# Patient Record
Sex: MALE | Race: WHITE | URBAN - METROPOLITAN AREA
[De-identification: names, ages, dates, MRNs, and addresses within clinical notes are randomized per-mention and may not be internally consistent; named-entity substitution may affect disease eponyms.]

---

## 2020-07-25 ENCOUNTER — TELEPHONE ENCOUNTER (OUTPATIENT)
Dept: URBAN - METROPOLITAN AREA CLINIC 13 | Facility: CLINIC | Age: 70
End: 2020-07-25

## 2020-07-25 RX ORDER — LOSARTAN POTASSIUM 100 MG/1
TAKE 1 TABLET DAILY TABLET, FILM COATED ORAL
Qty: 30 | Refills: 0 | OUTPATIENT
Start: 2013-06-18 | End: 2014-09-26

## 2020-07-25 RX ORDER — AMOXICILLIN AND CLAVULANATE POTASSIUM 875; 125 MG/1; MG/1
TAKE 1 TABLET TWICE DAILY TABLET, FILM COATED ORAL
Qty: 14 | Refills: 0 | OUTPATIENT
Start: 2014-09-26 | End: 2014-10-15

## 2020-07-25 RX ORDER — CHLORTHALIDONE 25 MG/1
TAKE 1/2 TABLET DAILY TABLET ORAL
Refills: 0 | OUTPATIENT
End: 2018-06-12

## 2020-07-25 RX ORDER — MELOXICAM 15 MG/1
TAKE 1 TABLET DAILY TABLET ORAL
Refills: 0 | OUTPATIENT
End: 2018-06-29

## 2020-07-25 RX ORDER — SITAGLIPTIN PHOSPHATE 100 MG
TAKE 1 TABLET DAILY PATIENT STATES DOSAGE UNKNOWN TABLET ORAL
Refills: 0 | OUTPATIENT
End: 2013-06-06

## 2020-07-25 RX ORDER — INSULIN GLARGINE 100 [IU]/ML
INJECT 30 UNIT TWICE DAILY INJECTION, SOLUTION SUBCUTANEOUS
Refills: 0 | OUTPATIENT
End: 2017-02-23

## 2020-07-25 RX ORDER — POLYETHYLENE GLYCOL 3350, SODIUM SULFATE, SODIUM CHLORIDE, POTASSIUM CHLORIDE, ASCORBIC ACID, SODIUM ASCORBATE 7.5-2.691G
TAKE 32 OZ AS DIRECTED 5:00PM THE EVENING BEFORE AND 6HR PRIOR TO PROCEDURE KIT ORAL
Qty: 1 | Refills: 0 | OUTPATIENT
Start: 2013-08-06 | End: 2013-08-22

## 2020-07-25 RX ORDER — POLYETHYLENE GLYCOL 3350, SODIUM CHLORIDE, SODIUM BICARBONATE AND POTASSIUM CHLORIDE WITH LEMON FLAVOR 420; 11.2; 5.72; 1.48 G/4L; G/4L; G/4L; G/4L
TAKE AS DIRECTED POWDER, FOR SOLUTION ORAL
Qty: 1 | Refills: 0 | OUTPATIENT
Start: 2015-09-03 | End: 2015-09-08

## 2020-07-25 RX ORDER — SITAGLIPTIN PHOSPHATE 100 MG
TAKE 1 TABLET DAILY UNKNOWN DOSAGE TABLET ORAL
Refills: 0 | OUTPATIENT
Start: 2011-09-23 | End: 2011-11-02

## 2020-07-25 RX ORDER — AMOXICILLIN 500 MG/1
TAKE 1 CAPSULE 3 TIMES DAILY UNTIL GONE CAPSULE ORAL
Refills: 0 | OUTPATIENT
End: 2017-02-23

## 2020-07-25 RX ORDER — ALLOPURINOL 300 MG/1
TAKE 1 TABLET DAILY TABLET ORAL
Refills: 0 | OUTPATIENT
End: 2014-09-26

## 2020-07-25 RX ORDER — LOSARTAN POTASSIUM 50 MG/1
TABLET, FILM COATED ORAL
Qty: 30 | Refills: 0 | OUTPATIENT
Start: 2011-12-12 | End: 2013-07-09

## 2020-07-25 RX ORDER — PRASUGREL HYDROCHLORIDE 10 MG/1
TAKE 1 TABLET DAILY TABLET, COATED ORAL
Refills: 0 | OUTPATIENT
End: 2017-02-23

## 2020-07-25 RX ORDER — FAMOTIDINE 40 MG/1
TAKE 1 TABLET TWICE DAILY TABLET, FILM COATED ORAL
Qty: 60 | Refills: 3 | OUTPATIENT
End: 2014-09-26

## 2020-07-25 RX ORDER — DEXTROSE 4 G
TAKE 1 TABLET  5 X DAILY TABLET,CHEWABLE ORAL
Refills: 0 | OUTPATIENT
End: 2018-06-12

## 2020-07-25 RX ORDER — UBIDECARENONE 200 MG
TAKE 1 CAPSULE DAILY CAPSULE ORAL
Refills: 0 | OUTPATIENT
End: 2017-02-23

## 2020-07-25 RX ORDER — MINOXIDIL 2.5 MG/1
TAKE 1 TABLET DAILY TABLET ORAL
Refills: 0 | OUTPATIENT
End: 2014-11-10

## 2020-07-25 RX ORDER — INSULIN DEGLUDEC INJECTION 100 U/ML
INJECT 80 UNIT DAILY INJECTION, SOLUTION SUBCUTANEOUS
Refills: 0 | OUTPATIENT
End: 2018-02-12

## 2020-07-25 RX ORDER — RABEPRAZOLE SODIUM 20 MG/1
TAKE 1 TABLET DAILY TABLET, DELAYED RELEASE ORAL
Qty: 30 | Refills: 3 | OUTPATIENT
Start: 2018-07-20 | End: 2019-04-18

## 2020-07-25 RX ORDER — PANTOPRAZOLE SODIUM 40 MG/1
TAKE 1 TABLET DAILY TABLET, DELAYED RELEASE ORAL
Qty: 5 | Refills: 11 | OUTPATIENT
Start: 2018-06-12 | End: 2018-07-20

## 2020-07-25 RX ORDER — LOSARTAN POTASSIUM 100 MG/1
TAKE 1 TABLET DAILY TABLET, FILM COATED ORAL
Refills: 0 | OUTPATIENT
End: 2014-10-15

## 2020-07-25 RX ORDER — OMEGA-3-ACID ETHYL ESTERS 1 G/1
CAPSULE, LIQUID FILLED ORAL
Qty: 60 | Refills: 0 | OUTPATIENT
Start: 2011-12-12 | End: 2013-06-06

## 2020-07-25 RX ORDER — POLYETHYLENE GLYCOL 3350, SODIUM CHLORIDE, SODIUM BICARBONATE AND POTASSIUM CHLORIDE WITH LEMON FLAVOR 420; 11.2; 5.72; 1.48 G/4L; G/4L; G/4L; G/4L
TAKE  ML AS DIRECTED MIX CONTENTS PER DIRECTIONS, BEGIN DRINKING 1/2 SOLUTION @ 5P, COMPLETE REMAINDER OF SOLUTION 6HR PRIOR TO PROCEDURE POWDER, FOR SOLUTION ORAL
Qty: 1 | Refills: 0 | OUTPATIENT
Start: 2015-07-13

## 2020-07-25 RX ORDER — HYOSCYAMINE SULFATE 0.12 MG/1
PLACE 1 TABLET EVERY 6 HOURS PRN TABLET, ORALLY DISINTEGRATING ORAL
Qty: 50 | Refills: 1 | OUTPATIENT
Start: 2014-11-10 | End: 2017-02-23

## 2020-07-25 RX ORDER — HYDROCODONE BITARTRATE AND ACETAMINOPHEN 10; 325 MG/1; MG/1
TABLET ORAL
Refills: 0 | OUTPATIENT
End: 2018-06-29

## 2020-07-25 RX ORDER — GLUCOSAMINE/MSM/CHONDROIT SULF 500-166.6
TAKE 1 CAPSULE DAILY TABLET ORAL
Refills: 0 | OUTPATIENT
End: 2017-02-23

## 2020-07-25 RX ORDER — SOLIFENACIN SUCCINATE 10 MG/1
TAKE 1 TABLET DAILY TABLET, FILM COATED ORAL
Refills: 0 | OUTPATIENT
End: 2014-09-26

## 2020-07-25 RX ORDER — CARVEDILOL 25 MG/1
TAKE 1 TABLET TWICE DAILY DOSAGE UNKNOWN TABLET, FILM COATED ORAL
Refills: 0 | OUTPATIENT
End: 2018-02-12

## 2020-07-26 ENCOUNTER — TELEPHONE ENCOUNTER (OUTPATIENT)
Dept: URBAN - METROPOLITAN AREA CLINIC 13 | Facility: CLINIC | Age: 70
End: 2020-07-26

## 2020-07-26 RX ORDER — PREDNISONE 20 MG/1
TABLET ORAL
Qty: 9 | Refills: 0 | Status: ACTIVE | COMMUNITY
Start: 2019-06-04

## 2020-07-26 RX ORDER — OXYCODONE AND ACETAMINOPHEN 5; 325 MG/1; MG/1
TABLET ORAL
Qty: 15 | Refills: 0 | Status: ACTIVE | COMMUNITY
Start: 2020-01-21

## 2020-07-26 RX ORDER — CIPROFLOXACIN HYDROCHLORIDE 500 MG/1
TABLET, FILM COATED ORAL
Qty: 28 | Refills: 0 | Status: ACTIVE | COMMUNITY
Start: 2014-11-20

## 2020-07-26 RX ORDER — COLCHICINE 0.6 MG/1
TABLET, FILM COATED ORAL
Qty: 60 | Refills: 0 | Status: ACTIVE | COMMUNITY
Start: 2019-08-02

## 2020-07-26 RX ORDER — TICAGRELOR 90 MG/1
TABLET ORAL
Qty: 180 | Refills: 0 | Status: ACTIVE | COMMUNITY
Start: 2014-06-27

## 2020-07-26 RX ORDER — CLINDAMYCIN HYDROCHLORIDE 150 MG/1
CAPSULE ORAL
Qty: 20 | Refills: 0 | Status: ACTIVE | COMMUNITY
Start: 2014-02-21

## 2020-07-26 RX ORDER — IRBESARTAN 300 MG/300MG
TABLET ORAL
Qty: 90 | Refills: 0 | Status: ACTIVE | COMMUNITY
Start: 2019-04-02

## 2020-07-26 RX ORDER — INSULIN GLARGINE 100 [IU]/ML
INJECTION, SOLUTION SUBCUTANEOUS
Qty: 15 | Refills: 0 | Status: ACTIVE | COMMUNITY
Start: 2019-01-29

## 2020-07-26 RX ORDER — MELOXICAM 7.5 MG/1
TABLET ORAL
Qty: 30 | Refills: 0 | Status: ACTIVE | COMMUNITY
Start: 2020-01-23

## 2020-07-26 RX ORDER — ICOSAPENT ETHYL 500 MG/1
CAPSULE ORAL
Qty: 120 | Refills: 0 | Status: ACTIVE | COMMUNITY
Start: 2019-01-18

## 2020-07-26 RX ORDER — DESONIDE 0.5 MG/G
GEL TOPICAL
Qty: 60 | Refills: 0 | Status: ACTIVE | COMMUNITY
Start: 2015-01-07

## 2020-07-26 RX ORDER — DEXTROSE 4 G
TAKE 2 TABLET EVERY MORNING PRN TABLET,CHEWABLE ORAL
Refills: 0 | Status: ACTIVE | COMMUNITY

## 2020-07-26 RX ORDER — INSULIN GLARGINE 100 [IU]/ML
INJECTION, SOLUTION SUBCUTANEOUS
Qty: 15 | Refills: 0 | Status: ACTIVE | COMMUNITY
Start: 2014-04-04

## 2020-07-26 RX ORDER — ICOSAPENT ETHYL 1000 MG/1
CAPSULE ORAL
Qty: 120 | Refills: 0 | Status: ACTIVE | COMMUNITY
Start: 2013-07-29

## 2020-07-26 RX ORDER — ONDANSETRON HYDROCHLORIDE 4 MG/1
TABLET, FILM COATED ORAL
Qty: 30 | Refills: 0 | Status: ACTIVE | COMMUNITY
Start: 2020-03-04

## 2020-07-26 RX ORDER — PREDNISOLONE ACETATE 10 MG/ML
SUSPENSION/ DROPS OPHTHALMIC
Qty: 10 | Refills: 0 | Status: ACTIVE | COMMUNITY
Start: 2012-07-28

## 2020-07-26 RX ORDER — BLOOD SUGAR DIAGNOSTIC
STRIP MISCELLANEOUS
Qty: 100 | Refills: 0 | Status: ACTIVE | COMMUNITY
Start: 2012-08-31

## 2020-07-26 RX ORDER — HYDROCODONE BITARTRATE AND ACETAMINOPHEN 5; 325 MG/1; MG/1
TABLET ORAL
Qty: 12 | Refills: 0 | Status: ACTIVE | COMMUNITY
Start: 2014-02-19

## 2020-07-26 RX ORDER — METHYLPREDNISOLONE 4 MG/1
TABLET ORAL
Qty: 21 | Refills: 0 | Status: ACTIVE | COMMUNITY
Start: 2019-07-16

## 2020-07-26 RX ORDER — HYDROCODONE BITARTRATE AND ACETAMINOPHEN 5; 325 MG/1; MG/1
TABLET ORAL
Qty: 18 | Refills: 0 | Status: ACTIVE | COMMUNITY
Start: 2019-08-02

## 2020-07-26 RX ORDER — KETOCONAZOLE 20 MG/G
CREAM TOPICAL
Qty: 30 | Refills: 0 | Status: ACTIVE | COMMUNITY
Start: 2011-12-07

## 2020-07-26 RX ORDER — POLYETHYLENE GLYCOL 3350, SODIUM CHLORIDE, SODIUM BICARBONATE AND POTASSIUM CHLORIDE WITH LEMON FLAVOR 420; 11.2; 5.72; 1.48 G/4L; G/4L; G/4L; G/4L
TAKE 1/2 GALLON AT 5:00 PM DAY BEFORE PROCEDURE, TAKE SECOND 1/2 OF GALLON 6 HRS PRIOR TO PROCEDURE POWDER, FOR SOLUTION ORAL
Qty: 1 | Refills: 0 | Status: ACTIVE | COMMUNITY
Start: 2020-01-06

## 2020-07-26 RX ORDER — PANTOPRAZOLE SODIUM 40 MG/1
TAKE 1 TABLET DAILY TABLET, DELAYED RELEASE ORAL
Qty: 30 | Refills: 11 | Status: ACTIVE | COMMUNITY
Start: 2019-08-07

## 2020-07-26 RX ORDER — TRIAMCINOLONE ACETONIDE 1 MG/G
CREAM TOPICAL
Qty: 255 | Refills: 0 | Status: ACTIVE | COMMUNITY
Start: 2015-01-07

## 2020-07-26 RX ORDER — INSULIN GLARGINE 100 [IU]/ML
INJECTION, SOLUTION SUBCUTANEOUS
Qty: 15 | Refills: 0 | Status: ACTIVE | COMMUNITY
Start: 2019-03-26

## 2020-07-26 RX ORDER — OXYCODONE AND ACETAMINOPHEN 5; 325 MG/1; MG/1
TABLET ORAL
Qty: 60 | Refills: 0 | Status: ACTIVE | COMMUNITY
Start: 2020-03-04

## 2020-07-26 RX ORDER — DEXAMETHASONE 4 MG/1
TABLET ORAL
Qty: 4 | Refills: 0 | Status: ACTIVE | COMMUNITY
Start: 2019-08-02

## 2020-07-26 RX ORDER — PROMETHAZINE HYDROCHLORIDE 25 MG/1
TABLET ORAL
Qty: 15 | Refills: 0 | Status: ACTIVE | COMMUNITY
Start: 2014-09-14

## 2020-07-26 RX ORDER — AMOXICILLIN 500 MG/1
CAPSULE ORAL
Qty: 28 | Refills: 0 | Status: ACTIVE | COMMUNITY
Start: 2012-11-02

## 2020-07-26 RX ORDER — BLOOD SUGAR DIAGNOSTIC
STRIP MISCELLANEOUS
Qty: 100 | Refills: 0 | Status: ACTIVE | COMMUNITY
Start: 2012-06-26

## 2020-07-26 RX ORDER — ACETAMINOPHEN AND CODEINE PHOSPHATE 300; 30 MG/1; MG/1
TABLET ORAL
Qty: 18 | Refills: 0 | Status: ACTIVE | COMMUNITY
Start: 2019-06-04

## 2020-07-26 RX ORDER — HYDROCODONE BITARTRATE AND ACETAMINOPHEN 5; 325 MG/1; MG/1
TAKE 1 TABLET BY MOUTH EVERY 6 HOURS AS NEEDED FOR PAIN TABLET ORAL
Qty: 12 | Refills: 0 | Status: ACTIVE | COMMUNITY
Start: 2019-12-29

## 2020-07-26 RX ORDER — CICLOPIROX OLAMINE 7.7 MG/ML
SUSPENSION TOPICAL
Qty: 30 | Refills: 0 | Status: ACTIVE | COMMUNITY
Start: 2015-01-07

## 2020-07-26 RX ORDER — NYSTATIN 100000 [USP'U]/ML
SUSPENSION ORAL
Qty: 240 | Refills: 0 | Status: ACTIVE | COMMUNITY
Start: 2012-01-02

## 2020-07-26 RX ORDER — LOSARTAN POTASSIUM 100 MG/1
TABLET, FILM COATED ORAL
Qty: 30 | Refills: 0 | Status: ACTIVE | COMMUNITY
Start: 2012-07-05

## 2020-07-26 RX ORDER — HYDRALAZINE HYDROCHLORIDE 25 MG/1
TABLET ORAL
Qty: 60 | Refills: 0 | Status: ACTIVE | COMMUNITY
Start: 2014-07-18

## 2020-07-26 RX ORDER — HYDROCODONE BITARTRATE AND ACETAMINOPHEN 5; 325 MG/1; MG/1
TABLET ORAL
Qty: 24 | Refills: 0 | Status: ACTIVE | COMMUNITY
Start: 2014-02-21

## 2020-07-26 RX ORDER — CLOPIDOGREL 75 MG/1
TABLET ORAL
Qty: 30 | Refills: 0 | Status: ACTIVE | COMMUNITY
Start: 2014-08-20

## 2020-07-26 RX ORDER — OXYCODONE AND ACETAMINOPHEN 5; 325 MG/1; MG/1
TABLET ORAL
Qty: 15 | Refills: 0 | Status: ACTIVE | COMMUNITY
Start: 2019-07-14

## 2020-07-26 RX ORDER — ASPIRIN 81 MG/1
TAKE 1 TABLET DAILY TABLET, DELAYED RELEASE ORAL
Refills: 0 | Status: ACTIVE | COMMUNITY

## 2020-07-26 RX ORDER — INSULIN GLARGINE 100 [IU]/ML
INJECT 30 UNIT TWICE DAILY INJECTION, SOLUTION SUBCUTANEOUS
Refills: 0 | Status: ACTIVE | COMMUNITY

## 2020-07-26 RX ORDER — NAPROXEN 500 MG/1
TABLET ORAL
Qty: 20 | Refills: 0 | Status: ACTIVE | COMMUNITY
Start: 2020-01-21

## 2020-07-26 RX ORDER — METRONIDAZOLE 500 MG/1
TABLET ORAL
Qty: 42 | Refills: 0 | Status: ACTIVE | COMMUNITY
Start: 2014-11-20

## 2020-10-07 ENCOUNTER — OFFICE VISIT (OUTPATIENT)
Dept: URBAN - METROPOLITAN AREA CLINIC 113 | Facility: CLINIC | Age: 70
End: 2020-10-07
Payer: MEDICARE

## 2020-10-07 VITALS
SYSTOLIC BLOOD PRESSURE: 145 MMHG | DIASTOLIC BLOOD PRESSURE: 85 MMHG | WEIGHT: 219 LBS | TEMPERATURE: 97.1 F | HEART RATE: 58 BPM | HEIGHT: 71 IN | BODY MASS INDEX: 30.66 KG/M2

## 2020-10-07 DIAGNOSIS — R19.7 DIARRHEA: ICD-10-CM

## 2020-10-07 PROCEDURE — 87507 IADNA-DNA/RNA PROBE TQ 12-25: CPT | Performed by: NURSE PRACTITIONER

## 2020-10-07 PROCEDURE — 99213 OFFICE O/P EST LOW 20 MIN: CPT | Performed by: NURSE PRACTITIONER

## 2020-10-07 PROCEDURE — G8427 DOCREV CUR MEDS BY ELIG CLIN: HCPCS | Performed by: NURSE PRACTITIONER

## 2020-10-07 PROCEDURE — G8483 FLU IMM NO ADMIN DOC REA: HCPCS | Performed by: NURSE PRACTITIONER

## 2020-10-07 PROCEDURE — G9903 PT SCRN TBCO ID AS NON USER: HCPCS | Performed by: NURSE PRACTITIONER

## 2020-10-07 RX ORDER — CIPROFLOXACIN HYDROCHLORIDE 500 MG/1
TABLET, FILM COATED ORAL
Qty: 28 | Refills: 0 | Status: ON HOLD | COMMUNITY
Start: 2014-11-20

## 2020-10-07 RX ORDER — AMOXICILLIN 500 MG/1
CAPSULE ORAL
Qty: 28 | Refills: 0 | Status: ON HOLD | COMMUNITY
Start: 2012-11-02

## 2020-10-07 RX ORDER — TRIAMCINOLONE ACETONIDE 1 MG/G
CREAM TOPICAL
Qty: 255 | Refills: 0 | Status: ON HOLD | COMMUNITY
Start: 2015-01-07

## 2020-10-07 RX ORDER — KETOCONAZOLE 20 MG/G
CREAM TOPICAL
Qty: 30 | Refills: 0 | Status: ON HOLD | COMMUNITY
Start: 2011-12-07

## 2020-10-07 RX ORDER — PREDNISOLONE ACETATE 10 MG/ML
SUSPENSION/ DROPS OPHTHALMIC
Qty: 10 | Refills: 0 | Status: ON HOLD | COMMUNITY
Start: 2012-07-28

## 2020-10-07 RX ORDER — APIXABAN 5 MG/1
AS DIRECTED TABLET, FILM COATED ORAL TWICE DAILY
Status: ACTIVE | COMMUNITY

## 2020-10-07 RX ORDER — BLOOD SUGAR DIAGNOSTIC
STRIP MISCELLANEOUS
Qty: 100 | Refills: 0 | Status: ON HOLD | COMMUNITY
Start: 2012-06-26

## 2020-10-07 RX ORDER — HYDROCODONE BITARTRATE AND ACETAMINOPHEN 5; 325 MG/1; MG/1
TABLET ORAL
Qty: 12 | Refills: 0 | Status: ON HOLD | COMMUNITY
Start: 2014-02-19

## 2020-10-07 RX ORDER — ICOSAPENT ETHYL 1000 MG/1
CAPSULE ORAL
Qty: 120 | Refills: 0 | Status: ON HOLD | COMMUNITY
Start: 2013-07-29

## 2020-10-07 RX ORDER — CICLOPIROX OLAMINE 7.7 MG/ML
SUSPENSION TOPICAL
Qty: 30 | Refills: 0 | Status: ON HOLD | COMMUNITY
Start: 2015-01-07

## 2020-10-07 RX ORDER — INSULIN GLARGINE 100 [IU]/ML
INJECT 30 UNIT TWICE DAILY INJECTION, SOLUTION SUBCUTANEOUS
Refills: 0 | Status: ON HOLD | COMMUNITY

## 2020-10-07 RX ORDER — LOSARTAN POTASSIUM 100 MG/1
TABLET, FILM COATED ORAL
Qty: 30 | Refills: 0 | Status: ON HOLD | COMMUNITY
Start: 2012-07-05

## 2020-10-07 RX ORDER — MELOXICAM 7.5 MG/1
TABLET ORAL
Qty: 30 | Refills: 0 | Status: ON HOLD | COMMUNITY
Start: 2020-01-23

## 2020-10-07 RX ORDER — PANTOPRAZOLE SODIUM 40 MG/1
TAKE 1 TABLET DAILY TABLET, DELAYED RELEASE ORAL
Qty: 30 | Refills: 11 | Status: ON HOLD | COMMUNITY
Start: 2019-08-07

## 2020-10-07 RX ORDER — SELENIUM SULFIDE 2.5 MG/100ML
1 APPLICATION TO WET SCALP LOTION TOPICAL TWICE DAILY
Status: ACTIVE | COMMUNITY

## 2020-10-07 RX ORDER — ICOSAPENT ETHYL 500 MG/1
CAPSULE ORAL
Qty: 120 | Refills: 0 | Status: ON HOLD | COMMUNITY
Start: 2019-01-18

## 2020-10-07 RX ORDER — ONDANSETRON HYDROCHLORIDE 4 MG/1
TABLET, FILM COATED ORAL
Qty: 30 | Refills: 0 | Status: ON HOLD | COMMUNITY
Start: 2020-03-04

## 2020-10-07 RX ORDER — POLYETHYLENE GLYCOL 3350, SODIUM CHLORIDE, SODIUM BICARBONATE AND POTASSIUM CHLORIDE WITH LEMON FLAVOR 420; 11.2; 5.72; 1.48 G/4L; G/4L; G/4L; G/4L
TAKE 1/2 GALLON AT 5:00 PM DAY BEFORE PROCEDURE, TAKE SECOND 1/2 OF GALLON 6 HRS PRIOR TO PROCEDURE POWDER, FOR SOLUTION ORAL
Qty: 1 | Refills: 0 | Status: ON HOLD | COMMUNITY
Start: 2020-01-06

## 2020-10-07 RX ORDER — DEXAMETHASONE 4 MG/1
TABLET ORAL
Qty: 4 | Refills: 0 | Status: ON HOLD | COMMUNITY
Start: 2019-08-02

## 2020-10-07 RX ORDER — HYDRALAZINE HYDROCHLORIDE 25 MG/1
1 TABLET TABLET ORAL TWICE DAILY
Refills: 0 | Status: ACTIVE | COMMUNITY
Start: 2014-07-18

## 2020-10-07 RX ORDER — FLUCONAZOLE 200 MG/1
1 TABLET TABLET ORAL ONCE DAILY
Status: ACTIVE | COMMUNITY

## 2020-10-07 RX ORDER — METHYLPREDNISOLONE 4 MG/1
TABLET ORAL
Qty: 21 | Refills: 0 | Status: ON HOLD | COMMUNITY
Start: 2019-07-16

## 2020-10-07 RX ORDER — METRONIDAZOLE 500 MG/1
TABLET ORAL
Qty: 42 | Refills: 0 | Status: ON HOLD | COMMUNITY
Start: 2014-11-20

## 2020-10-07 RX ORDER — TICAGRELOR 90 MG/1
TABLET ORAL
Qty: 180 | Refills: 0 | Status: ON HOLD | COMMUNITY
Start: 2014-06-27

## 2020-10-07 RX ORDER — DESONIDE 0.5 MG/G
GEL TOPICAL
Qty: 60 | Refills: 0 | Status: ON HOLD | COMMUNITY
Start: 2015-01-07

## 2020-10-07 RX ORDER — ASPIRIN 81 MG/1
TAKE 1 TABLET DAILY TABLET, DELAYED RELEASE ORAL
Refills: 0 | Status: ACTIVE | COMMUNITY

## 2020-10-07 RX ORDER — BLOOD SUGAR DIAGNOSTIC
STRIP MISCELLANEOUS
Qty: 100 | Refills: 0 | Status: ON HOLD | COMMUNITY
Start: 2012-08-31

## 2020-10-07 RX ORDER — ACETAMINOPHEN AND CODEINE PHOSPHATE 300; 30 MG/1; MG/1
TABLET ORAL
Qty: 18 | Refills: 0 | Status: ON HOLD | COMMUNITY
Start: 2019-06-04

## 2020-10-07 RX ORDER — DEXTROSE 4 G
TAKE 2 TABLET EVERY MORNING PRN TABLET,CHEWABLE ORAL
Refills: 0 | Status: ON HOLD | COMMUNITY

## 2020-10-07 RX ORDER — HYDROCORTISONE 10 MG/G
1 APPLICATION CREAM TOPICAL TWICE A DAY
Status: ACTIVE | COMMUNITY

## 2020-10-07 RX ORDER — CLOPIDOGREL 75 MG/1
TABLET ORAL
Qty: 30 | Refills: 0 | Status: ON HOLD | COMMUNITY
Start: 2014-08-20

## 2020-10-07 RX ORDER — INSULIN GLARGINE 100 [IU]/ML
INJECTION, SOLUTION SUBCUTANEOUS
Qty: 15 | Refills: 0 | Status: ON HOLD | COMMUNITY
Start: 2014-04-04

## 2020-10-07 RX ORDER — IRBESARTAN 300 MG/300MG
1 TABLET TABLET ORAL ONCE A DAY
Refills: 0 | Status: ACTIVE | COMMUNITY
Start: 2019-04-02

## 2020-10-07 RX ORDER — GLUCOSAM/CHONDRO/HERB 149/HYAL 750-100 MG
1 TABLET TABLET ORAL ONCE A DAY
Status: ACTIVE | COMMUNITY

## 2020-10-07 RX ORDER — PROMETHAZINE HYDROCHLORIDE 25 MG/1
TABLET ORAL
Qty: 15 | Refills: 0 | Status: ON HOLD | COMMUNITY
Start: 2014-09-14

## 2020-10-07 RX ORDER — NYSTATIN 100000 [USP'U]/ML
SUSPENSION ORAL
Qty: 240 | Refills: 0 | Status: ON HOLD | COMMUNITY
Start: 2012-01-02

## 2020-10-07 RX ORDER — COLCHICINE 0.6 MG/1
TABLET, FILM COATED ORAL
Qty: 60 | Refills: 0 | Status: ON HOLD | COMMUNITY
Start: 2019-08-02

## 2020-10-07 RX ORDER — CLINDAMYCIN HYDROCHLORIDE 150 MG/1
CAPSULE ORAL
Qty: 20 | Refills: 0 | Status: ON HOLD | COMMUNITY
Start: 2014-02-21

## 2020-10-07 RX ORDER — OXYCODONE AND ACETAMINOPHEN 5; 325 MG/1; MG/1
TABLET ORAL
Qty: 15 | Refills: 0 | Status: ON HOLD | COMMUNITY
Start: 2019-07-14

## 2020-10-07 RX ORDER — PREDNISONE 20 MG/1
TABLET ORAL
Qty: 9 | Refills: 0 | Status: ON HOLD | COMMUNITY
Start: 2019-06-04

## 2020-10-07 RX ORDER — NAPROXEN 500 MG/1
TABLET ORAL
Qty: 20 | Refills: 0 | Status: ON HOLD | COMMUNITY
Start: 2020-01-21

## 2020-10-07 NOTE — HPI-TODAY'S VISIT:
Mr. Walton is a 70-year-old male last seen in the office on 8/7/2019 with a history of coronary artery disease with prior MI status post CABG and stent placement x3, sleep apnea, insulin-dependent type 2 diabetes, stage III chronic kidney disease, and adenomatous colon polyps, presenting for evaluation of diarrhea, gas and abdominal discomfort. At last visit on 8/7/2019 he reported feeling well.  Swallowing issues had resolved following dilation of esophageal stricture.  He was noted to be due for surveillance colonoscopy for history of colon polyps in 2020.  Since his last visit, he has had neck surgery for pinched nerves, knee surgery, right toe fractures, and then developed blood clots for which he is taking Eliquis. Three months ago, he had oral surgery, and was treated with Clindamycin. He developed diarrhea following initiation of Clindamycin, and then switched to amoxicillin. He has been having loose watery stools ever since. He has 3 stools per day. Over the weekend, he had vomiting in addition to diarrhea. There was fever and chills initially, but this is resolved. He describes intermittent abdominal discomfort described as cramping and aching. He sometimes experiences nocturnal defecation. He states he is fearful of passing gas for fear of stool incontinence. There is no blood per rectum.

## 2020-10-30 ENCOUNTER — OFFICE VISIT (OUTPATIENT)
Dept: URBAN - METROPOLITAN AREA CLINIC 113 | Facility: CLINIC | Age: 70
End: 2020-10-30
Payer: MEDICARE

## 2020-10-30 VITALS
BODY MASS INDEX: 30.94 KG/M2 | HEART RATE: 60 BPM | WEIGHT: 221 LBS | HEIGHT: 71 IN | SYSTOLIC BLOOD PRESSURE: 150 MMHG | DIASTOLIC BLOOD PRESSURE: 92 MMHG | TEMPERATURE: 96.9 F

## 2020-10-30 DIAGNOSIS — R14.0 BLOATING: ICD-10-CM

## 2020-10-30 DIAGNOSIS — R19.7 DIARRHEA: ICD-10-CM

## 2020-10-30 PROBLEM — 60728008 BLOATING: Status: ACTIVE | Noted: 2020-10-30

## 2020-10-30 PROCEDURE — 99213 OFFICE O/P EST LOW 20 MIN: CPT | Performed by: NURSE PRACTITIONER

## 2020-10-30 PROCEDURE — G8483 FLU IMM NO ADMIN DOC REA: HCPCS | Performed by: NURSE PRACTITIONER

## 2020-10-30 RX ORDER — KETOCONAZOLE 20 MG/G
CREAM TOPICAL
Qty: 30 | Refills: 0 | Status: ON HOLD | COMMUNITY
Start: 2011-12-07

## 2020-10-30 RX ORDER — DEXTROSE 4 G
TAKE 2 TABLET EVERY MORNING PRN TABLET,CHEWABLE ORAL
Refills: 0 | Status: ON HOLD | COMMUNITY

## 2020-10-30 RX ORDER — PREDNISONE 20 MG/1
TABLET ORAL
Qty: 9 | Refills: 0 | Status: ON HOLD | COMMUNITY
Start: 2019-06-04

## 2020-10-30 RX ORDER — GLUCOSAM/CHONDRO/HERB 149/HYAL 750-100 MG
1 TABLET TABLET ORAL ONCE A DAY
Status: ACTIVE | COMMUNITY

## 2020-10-30 RX ORDER — HYDRALAZINE HYDROCHLORIDE 25 MG/1
1 TABLET TABLET ORAL TWICE DAILY
Refills: 0 | Status: ACTIVE | COMMUNITY
Start: 2014-07-18

## 2020-10-30 RX ORDER — ICOSAPENT ETHYL 500 MG/1
CAPSULE ORAL
Qty: 120 | Refills: 0 | Status: ON HOLD | COMMUNITY
Start: 2019-01-18

## 2020-10-30 RX ORDER — BLOOD SUGAR DIAGNOSTIC
STRIP MISCELLANEOUS
Qty: 100 | Refills: 0 | Status: ON HOLD | COMMUNITY
Start: 2012-08-31

## 2020-10-30 RX ORDER — CIPROFLOXACIN HYDROCHLORIDE 500 MG/1
TABLET, FILM COATED ORAL
Qty: 28 | Refills: 0 | Status: ON HOLD | COMMUNITY
Start: 2014-11-20

## 2020-10-30 RX ORDER — INSULIN GLARGINE 100 [IU]/ML
INJECT 30 UNIT TWICE DAILY INJECTION, SOLUTION SUBCUTANEOUS
Refills: 0 | Status: ON HOLD | COMMUNITY

## 2020-10-30 RX ORDER — TRIAMCINOLONE ACETONIDE 1 MG/G
CREAM TOPICAL
Qty: 255 | Refills: 0 | Status: ON HOLD | COMMUNITY
Start: 2015-01-07

## 2020-10-30 RX ORDER — APIXABAN 5 MG/1
AS DIRECTED TABLET, FILM COATED ORAL TWICE DAILY
Status: ACTIVE | COMMUNITY

## 2020-10-30 RX ORDER — CICLOPIROX OLAMINE 7.7 MG/ML
SUSPENSION TOPICAL
Qty: 30 | Refills: 0 | Status: ON HOLD | COMMUNITY
Start: 2015-01-07

## 2020-10-30 RX ORDER — BLOOD SUGAR DIAGNOSTIC
STRIP MISCELLANEOUS
Qty: 100 | Refills: 0 | Status: ON HOLD | COMMUNITY
Start: 2012-06-26

## 2020-10-30 RX ORDER — METRONIDAZOLE 500 MG/1
TABLET ORAL
Qty: 42 | Refills: 0 | Status: ON HOLD | COMMUNITY
Start: 2014-11-20

## 2020-10-30 RX ORDER — MELOXICAM 7.5 MG/1
TABLET ORAL
Qty: 30 | Refills: 0 | Status: ON HOLD | COMMUNITY
Start: 2020-01-23

## 2020-10-30 RX ORDER — AMOXICILLIN 500 MG/1
CAPSULE ORAL
Qty: 28 | Refills: 0 | Status: ON HOLD | COMMUNITY
Start: 2012-11-02

## 2020-10-30 RX ORDER — OXYCODONE AND ACETAMINOPHEN 5; 325 MG/1; MG/1
TABLET ORAL
Qty: 15 | Refills: 0 | Status: ON HOLD | COMMUNITY
Start: 2019-07-14

## 2020-10-30 RX ORDER — COLCHICINE 0.6 MG/1
TABLET, FILM COATED ORAL
Qty: 60 | Refills: 0 | Status: ON HOLD | COMMUNITY
Start: 2019-08-02

## 2020-10-30 RX ORDER — DEXAMETHASONE 4 MG/1
TABLET ORAL
Qty: 4 | Refills: 0 | Status: ON HOLD | COMMUNITY
Start: 2019-08-02

## 2020-10-30 RX ORDER — PANTOPRAZOLE SODIUM 40 MG/1
TAKE 1 TABLET DAILY TABLET, DELAYED RELEASE ORAL
Qty: 30 | Refills: 11 | Status: ON HOLD | COMMUNITY
Start: 2019-08-07

## 2020-10-30 RX ORDER — PREDNISOLONE ACETATE 10 MG/ML
SUSPENSION/ DROPS OPHTHALMIC
Qty: 10 | Refills: 0 | Status: ON HOLD | COMMUNITY
Start: 2012-07-28

## 2020-10-30 RX ORDER — HYDROCORTISONE 10 MG/G
1 APPLICATION CREAM TOPICAL TWICE A DAY
Status: ON HOLD | COMMUNITY

## 2020-10-30 RX ORDER — INSULIN GLARGINE 100 [IU]/ML
INJECTION, SOLUTION SUBCUTANEOUS
Qty: 15 | Refills: 0 | Status: ON HOLD | COMMUNITY
Start: 2014-04-04

## 2020-10-30 RX ORDER — IRBESARTAN 300 MG/300MG
1 TABLET TABLET ORAL ONCE A DAY
Refills: 0 | Status: ACTIVE | COMMUNITY
Start: 2019-04-02

## 2020-10-30 RX ORDER — HYDROCODONE BITARTRATE AND ACETAMINOPHEN 5; 325 MG/1; MG/1
TABLET ORAL
Qty: 12 | Refills: 0 | Status: ON HOLD | COMMUNITY
Start: 2014-02-19

## 2020-10-30 RX ORDER — METHYLPREDNISOLONE 4 MG/1
TABLET ORAL
Qty: 21 | Refills: 0 | Status: ON HOLD | COMMUNITY
Start: 2019-07-16

## 2020-10-30 RX ORDER — FLUCONAZOLE 200 MG/1
1 TABLET TABLET ORAL ONCE DAILY
Status: ACTIVE | COMMUNITY

## 2020-10-30 RX ORDER — NYSTATIN 100000 [USP'U]/ML
SUSPENSION ORAL
Qty: 240 | Refills: 0 | Status: ON HOLD | COMMUNITY
Start: 2012-01-02

## 2020-10-30 RX ORDER — POLYETHYLENE GLYCOL 3350, SODIUM CHLORIDE, SODIUM BICARBONATE AND POTASSIUM CHLORIDE WITH LEMON FLAVOR 420; 11.2; 5.72; 1.48 G/4L; G/4L; G/4L; G/4L
TAKE 1/2 GALLON AT 5:00 PM DAY BEFORE PROCEDURE, TAKE SECOND 1/2 OF GALLON 6 HRS PRIOR TO PROCEDURE POWDER, FOR SOLUTION ORAL
Qty: 1 | Refills: 0 | Status: ON HOLD | COMMUNITY
Start: 2020-01-06

## 2020-10-30 RX ORDER — LOSARTAN POTASSIUM 100 MG/1
TABLET, FILM COATED ORAL
Qty: 30 | Refills: 0 | Status: ON HOLD | COMMUNITY
Start: 2012-07-05

## 2020-10-30 RX ORDER — ACETAMINOPHEN AND CODEINE PHOSPHATE 300; 30 MG/1; MG/1
TABLET ORAL
Qty: 18 | Refills: 0 | Status: ON HOLD | COMMUNITY
Start: 2019-06-04

## 2020-10-30 RX ORDER — ICOSAPENT ETHYL 1000 MG/1
CAPSULE ORAL
Qty: 120 | Refills: 0 | Status: ON HOLD | COMMUNITY
Start: 2013-07-29

## 2020-10-30 RX ORDER — SELENIUM SULFIDE 2.5 MG/100ML
1 APPLICATION TO WET SCALP LOTION TOPICAL TWICE DAILY
Status: ON HOLD | COMMUNITY

## 2020-10-30 RX ORDER — CLINDAMYCIN HYDROCHLORIDE 150 MG/1
CAPSULE ORAL
Qty: 20 | Refills: 0 | Status: ON HOLD | COMMUNITY
Start: 2014-02-21

## 2020-10-30 RX ORDER — TICAGRELOR 90 MG/1
TABLET ORAL
Qty: 180 | Refills: 0 | Status: ON HOLD | COMMUNITY
Start: 2014-06-27

## 2020-10-30 RX ORDER — NAPROXEN 500 MG/1
TABLET ORAL
Qty: 20 | Refills: 0 | Status: ON HOLD | COMMUNITY
Start: 2020-01-21

## 2020-10-30 RX ORDER — PROMETHAZINE HYDROCHLORIDE 25 MG/1
TABLET ORAL
Qty: 15 | Refills: 0 | Status: ON HOLD | COMMUNITY
Start: 2014-09-14

## 2020-10-30 RX ORDER — ONDANSETRON HYDROCHLORIDE 4 MG/1
TABLET, FILM COATED ORAL
Qty: 30 | Refills: 0 | Status: ON HOLD | COMMUNITY
Start: 2020-03-04

## 2020-10-30 RX ORDER — ASPIRIN 81 MG/1
TAKE 1 TABLET DAILY TABLET, DELAYED RELEASE ORAL
Refills: 0 | Status: ACTIVE | COMMUNITY

## 2020-10-30 RX ORDER — CLOPIDOGREL 75 MG/1
TABLET ORAL
Qty: 30 | Refills: 0 | Status: ON HOLD | COMMUNITY
Start: 2014-08-20

## 2020-10-30 RX ORDER — DESONIDE 0.5 MG/G
GEL TOPICAL
Qty: 60 | Refills: 0 | Status: ON HOLD | COMMUNITY
Start: 2015-01-07

## 2020-10-30 NOTE — HPI-TODAY'S VISIT:
Mr. Walton is a 70-year-old male with a history of coronary artery disease with prior MI status post CABG and stent placement x3, sleep apnea, insulin-dependent type 2 diabetes, stage III chronic kidney disease, and adenomatous colon polyp, presenting for 3-week follow-up of diarrhea, gas and abdominal discomfort. He was last seen in the office on 10/4/2020 for diarrhea predominant bowel habits ongoing for 3 months following use of clindamycin and amoxicillin following a dental procedure.  Symptoms were concerning for possible C. difficile colitis for which she was recommended stool studies to assess for infection.  Stool studies on 10/9/2028 were negative for C. difficile toxin, as well as extensive GI stool profile.  He reports that his stools are starting to normalize. He started eating yogurt with a probiotic this week. He thinks that may be the improvement in bowel habits. He continues with increased gaseousness. This mostly occurs after a meal. He has tried Beano and Gas-x without any improvement. There is no abdominal pain, nausea or vomiting. He is taking Metamucil daily.

## 2021-01-14 ENCOUNTER — OFFICE VISIT (OUTPATIENT)
Dept: URBAN - METROPOLITAN AREA CLINIC 113 | Facility: CLINIC | Age: 71
End: 2021-01-14
Payer: MEDICARE

## 2021-01-14 VITALS
SYSTOLIC BLOOD PRESSURE: 163 MMHG | HEIGHT: 71 IN | BODY MASS INDEX: 30.94 KG/M2 | DIASTOLIC BLOOD PRESSURE: 83 MMHG | TEMPERATURE: 96.9 F | WEIGHT: 221 LBS | HEART RATE: 70 BPM

## 2021-01-14 DIAGNOSIS — R19.7 DIARRHEA, UNSPECIFIED TYPE: ICD-10-CM

## 2021-01-14 DIAGNOSIS — D12.6 TUBULOVILLOUS ADENOMA OF COLON: ICD-10-CM

## 2021-01-14 PROBLEM — 428054006: Status: ACTIVE | Noted: 2021-01-14

## 2021-01-14 PROCEDURE — G9903 PT SCRN TBCO ID AS NON USER: HCPCS | Performed by: INTERNAL MEDICINE

## 2021-01-14 PROCEDURE — G8427 DOCREV CUR MEDS BY ELIG CLIN: HCPCS | Performed by: INTERNAL MEDICINE

## 2021-01-14 PROCEDURE — 3017F COLORECTAL CA SCREEN DOC REV: CPT | Performed by: INTERNAL MEDICINE

## 2021-01-14 PROCEDURE — G8483 FLU IMM NO ADMIN DOC REA: HCPCS | Performed by: INTERNAL MEDICINE

## 2021-01-14 PROCEDURE — 99212 OFFICE O/P EST SF 10 MIN: CPT | Performed by: INTERNAL MEDICINE

## 2021-01-14 RX ORDER — ACETAMINOPHEN AND CODEINE PHOSPHATE 300; 30 MG/1; MG/1
TABLET ORAL
Qty: 18 | Refills: 0 | Status: ON HOLD | COMMUNITY
Start: 2019-06-04

## 2021-01-14 RX ORDER — DEXAMETHASONE 4 MG/1
TABLET ORAL
Qty: 4 | Refills: 0 | Status: ON HOLD | COMMUNITY
Start: 2019-08-02

## 2021-01-14 RX ORDER — HYDROCODONE BITARTRATE AND ACETAMINOPHEN 5; 325 MG/1; MG/1
TABLET ORAL
Qty: 12 | Refills: 0 | Status: ON HOLD | COMMUNITY
Start: 2014-02-19

## 2021-01-14 RX ORDER — POLYETHYLENE GLYCOL 3350, SODIUM CHLORIDE, SODIUM BICARBONATE AND POTASSIUM CHLORIDE WITH LEMON FLAVOR 420; 11.2; 5.72; 1.48 G/4L; G/4L; G/4L; G/4L
TAKE 1/2 GALLON AT 5:00 PM DAY BEFORE PROCEDURE, TAKE SECOND 1/2 OF GALLON 6 HRS PRIOR TO PROCEDURE POWDER, FOR SOLUTION ORAL
Qty: 1 | Refills: 0 | Status: ON HOLD | COMMUNITY
Start: 2020-01-06

## 2021-01-14 RX ORDER — SELENIUM SULFIDE 2.5 MG/100ML
1 APPLICATION TO WET SCALP LOTION TOPICAL TWICE DAILY
Status: ON HOLD | COMMUNITY

## 2021-01-14 RX ORDER — MELOXICAM 7.5 MG/1
TABLET ORAL
Qty: 30 | Refills: 0 | Status: ON HOLD | COMMUNITY
Start: 2020-01-23

## 2021-01-14 RX ORDER — LOSARTAN POTASSIUM 100 MG/1
TABLET, FILM COATED ORAL
Qty: 30 | Refills: 0 | Status: ON HOLD | COMMUNITY
Start: 2012-07-05

## 2021-01-14 RX ORDER — FLUCONAZOLE 200 MG/1
1 TABLET TABLET ORAL ONCE DAILY
Status: ON HOLD | COMMUNITY

## 2021-01-14 RX ORDER — INSULIN GLARGINE 100 [IU]/ML
INJECTION, SOLUTION SUBCUTANEOUS
Qty: 15 | Refills: 0 | Status: ON HOLD | COMMUNITY
Start: 2014-04-04

## 2021-01-14 RX ORDER — PREDNISOLONE ACETATE 10 MG/ML
SUSPENSION/ DROPS OPHTHALMIC
Qty: 10 | Refills: 0 | Status: ON HOLD | COMMUNITY
Start: 2012-07-28

## 2021-01-14 RX ORDER — BLOOD SUGAR DIAGNOSTIC
STRIP MISCELLANEOUS
Qty: 100 | Refills: 0 | Status: ON HOLD | COMMUNITY
Start: 2012-06-26

## 2021-01-14 RX ORDER — BLOOD SUGAR DIAGNOSTIC
STRIP MISCELLANEOUS
Qty: 100 | Refills: 0 | Status: ON HOLD | COMMUNITY
Start: 2012-08-31

## 2021-01-14 RX ORDER — APIXABAN 5 MG/1
AS DIRECTED TABLET, FILM COATED ORAL TWICE DAILY
Status: ACTIVE | COMMUNITY

## 2021-01-14 RX ORDER — CLOPIDOGREL 75 MG/1
TABLET ORAL
Qty: 30 | Refills: 0 | Status: ON HOLD | COMMUNITY
Start: 2014-08-20

## 2021-01-14 RX ORDER — ONDANSETRON HYDROCHLORIDE 4 MG/1
TABLET, FILM COATED ORAL
Qty: 30 | Refills: 0 | Status: ON HOLD | COMMUNITY
Start: 2020-03-04

## 2021-01-14 RX ORDER — INSULIN GLARGINE 100 [IU]/ML
INJECT 30 UNIT TWICE DAILY INJECTION, SOLUTION SUBCUTANEOUS
Refills: 0 | Status: ON HOLD | COMMUNITY

## 2021-01-14 RX ORDER — CLINDAMYCIN HYDROCHLORIDE 150 MG/1
CAPSULE ORAL
Qty: 20 | Refills: 0 | Status: ON HOLD | COMMUNITY
Start: 2014-02-21

## 2021-01-14 RX ORDER — CIPROFLOXACIN HYDROCHLORIDE 500 MG/1
TABLET, FILM COATED ORAL
Qty: 28 | Refills: 0 | Status: ON HOLD | COMMUNITY
Start: 2014-11-20

## 2021-01-14 RX ORDER — GLUCOSAM/CHONDRO/HERB 149/HYAL 750-100 MG
1 TABLET TABLET ORAL ONCE A DAY
Status: ACTIVE | COMMUNITY

## 2021-01-14 RX ORDER — ICOSAPENT ETHYL 1000 MG/1
CAPSULE ORAL
Qty: 120 | Refills: 0 | Status: ON HOLD | COMMUNITY
Start: 2013-07-29

## 2021-01-14 RX ORDER — DESONIDE 0.5 MG/G
GEL TOPICAL
Qty: 60 | Refills: 0 | Status: ON HOLD | COMMUNITY
Start: 2015-01-07

## 2021-01-14 RX ORDER — NAPROXEN 500 MG/1
TABLET ORAL
Qty: 20 | Refills: 0 | Status: ON HOLD | COMMUNITY
Start: 2020-01-21

## 2021-01-14 RX ORDER — AMOXICILLIN 500 MG/1
CAPSULE ORAL
Qty: 28 | Refills: 0 | Status: ON HOLD | COMMUNITY
Start: 2012-11-02

## 2021-01-14 RX ORDER — TRIAMCINOLONE ACETONIDE 1 MG/G
CREAM TOPICAL
Qty: 255 | Refills: 0 | Status: ON HOLD | COMMUNITY
Start: 2015-01-07

## 2021-01-14 RX ORDER — KETOCONAZOLE 20 MG/G
CREAM TOPICAL
Qty: 30 | Refills: 0 | Status: ON HOLD | COMMUNITY
Start: 2011-12-07

## 2021-01-14 RX ORDER — CICLOPIROX OLAMINE 7.7 MG/ML
SUSPENSION TOPICAL
Qty: 30 | Refills: 0 | Status: ON HOLD | COMMUNITY
Start: 2015-01-07

## 2021-01-14 RX ORDER — COLCHICINE 0.6 MG/1
TABLET, FILM COATED ORAL
Qty: 60 | Refills: 0 | Status: ON HOLD | COMMUNITY
Start: 2019-08-02

## 2021-01-14 RX ORDER — PANTOPRAZOLE SODIUM 40 MG/1
TAKE 1 TABLET DAILY TABLET, DELAYED RELEASE ORAL
Qty: 30 | Refills: 11 | Status: ON HOLD | COMMUNITY
Start: 2019-08-07

## 2021-01-14 RX ORDER — NYSTATIN 100000 [USP'U]/ML
SUSPENSION ORAL
Qty: 240 | Refills: 0 | Status: ON HOLD | COMMUNITY
Start: 2012-01-02

## 2021-01-14 RX ORDER — OXYCODONE AND ACETAMINOPHEN 5; 325 MG/1; MG/1
TABLET ORAL
Qty: 15 | Refills: 0 | Status: ON HOLD | COMMUNITY
Start: 2019-07-14

## 2021-01-14 RX ORDER — METHYLPREDNISOLONE 4 MG/1
TABLET ORAL
Qty: 21 | Refills: 0 | Status: ON HOLD | COMMUNITY
Start: 2019-07-16

## 2021-01-14 RX ORDER — PREDNISONE 20 MG/1
TABLET ORAL
Qty: 9 | Refills: 0 | Status: ON HOLD | COMMUNITY
Start: 2019-06-04

## 2021-01-14 RX ORDER — ICOSAPENT ETHYL 500 MG/1
CAPSULE ORAL
Qty: 120 | Refills: 0 | Status: ON HOLD | COMMUNITY
Start: 2019-01-18

## 2021-01-14 RX ORDER — TICAGRELOR 90 MG/1
TABLET ORAL
Qty: 180 | Refills: 0 | Status: ON HOLD | COMMUNITY
Start: 2014-06-27

## 2021-01-14 RX ORDER — HYDRALAZINE HYDROCHLORIDE 25 MG/1
1 TABLET TABLET ORAL TWICE DAILY
Refills: 0 | Status: ACTIVE | COMMUNITY
Start: 2014-07-18

## 2021-01-14 RX ORDER — ASPIRIN 81 MG/1
TAKE 1 TABLET DAILY TABLET, DELAYED RELEASE ORAL
Refills: 0 | Status: ACTIVE | COMMUNITY

## 2021-01-14 RX ORDER — HYDROCORTISONE 10 MG/G
1 APPLICATION CREAM TOPICAL TWICE A DAY
Status: ON HOLD | COMMUNITY

## 2021-01-14 RX ORDER — PROMETHAZINE HYDROCHLORIDE 25 MG/1
TABLET ORAL
Qty: 15 | Refills: 0 | Status: ON HOLD | COMMUNITY
Start: 2014-09-14

## 2021-01-14 RX ORDER — DEXTROSE 4 G
TAKE 2 TABLET EVERY MORNING PRN TABLET,CHEWABLE ORAL
Refills: 0 | Status: ON HOLD | COMMUNITY

## 2021-01-14 RX ORDER — IRBESARTAN 300 MG/300MG
1 TABLET TABLET ORAL ONCE A DAY
Refills: 0 | Status: ACTIVE | COMMUNITY
Start: 2019-04-02

## 2021-01-14 RX ORDER — METRONIDAZOLE 500 MG/1
TABLET ORAL
Qty: 42 | Refills: 0 | Status: ON HOLD | COMMUNITY
Start: 2014-11-20

## 2021-01-14 NOTE — HPI-TODAY'S VISIT:
Mr. Walton is a 70-year-old male with a history of coronary artery disease with prior MI status post CABG and stent placement x3, sleep apnea, insulin-dependent type 2 diabetes, stage III chronic kidney disease, and adenomatous colon polyp, presenting for 3-week follow-up of diarrhea, gas and abdominal discomfort. He was seen in the office on 10/4/2020 for diarrhea predominant bowel habits ongoing for 3 months following use of clindamycin and amoxicillin following a dental procedure.  Symptoms were concerning for possible C. difficile colitis for which she was recommended stool studies to assess for infection.  Stool studies on 10/9/2028 were negative for C. difficile toxin, as well as extensive GI stool profile. he is doing fairly well.  His stools are nearly back to normal with one to 2 soft bowel movements per day.  This been no blood or melena.  He denies any dysphagia.  He rarely will get some heartburn opinion on what he eats.  He occasionally has some discomfort right before he has a bowel movement.  That has been infrequent.  There is no fevers or chills.  He had deep vein thrombosis of his left lower extremity in the end of August last year.  He is on anticoagulants currently and is followed by vascular surgery.  He thinks that he'll be able to come off the anticoagulants in a couple months.

## 2021-08-23 LAB
ADENOVIRUS F 40/41: NOT DETECTED
ASTROVIRUS: NOT DETECTED
C DIFFICILE TOXIN A/B: NOT DETECTED
C DIFFICILE TOXINS A+B, EIA: NEGATIVE
CAMPYLOBACTER: NOT DETECTED
CRYPTOSPORIDIUM: NOT DETECTED
CYCLOSPORA CAYETANENSIS: NOT DETECTED
E COLI O157: (no result)
ENTAMOEBA HISTOLYTICA: NOT DETECTED
ENTEROAGGREGATIVE E COLI: NOT DETECTED
ENTEROPATHOGENIC E COLI: NOT DETECTED
ENTEROTOXIGENIC E COLI: NOT DETECTED
GIARDIA LAMBLIA: NOT DETECTED
NOROVIRUS GI/GII: NOT DETECTED
PLESIOMONAS SHIGELLOIDES: NOT DETECTED
ROTAVIRUS A: NOT DETECTED
SALMONELLA: NOT DETECTED
SAPOVIRUS: NOT DETECTED
SHIGA-TOXIN-PRODUCING E COLI: NOT DETECTED
SHIGELLA/ENTEROINVASIVE E COLI: NOT DETECTED
VIBRIO CHOLERAE: NOT DETECTED
VIBRIO: NOT DETECTED
YERSINIA ENTEROCOLITICA: NOT DETECTED

## 2022-08-12 ENCOUNTER — WEB ENCOUNTER (OUTPATIENT)
Dept: URBAN - METROPOLITAN AREA CLINIC 107 | Facility: CLINIC | Age: 72
End: 2022-08-12

## 2022-08-16 ENCOUNTER — LAB OUTSIDE AN ENCOUNTER (OUTPATIENT)
Dept: URBAN - METROPOLITAN AREA CLINIC 107 | Facility: CLINIC | Age: 72
End: 2022-08-16

## 2022-08-16 ENCOUNTER — OFFICE VISIT (OUTPATIENT)
Dept: URBAN - METROPOLITAN AREA CLINIC 107 | Facility: CLINIC | Age: 72
End: 2022-08-16
Payer: MEDICARE

## 2022-08-16 VITALS
WEIGHT: 226 LBS | DIASTOLIC BLOOD PRESSURE: 91 MMHG | SYSTOLIC BLOOD PRESSURE: 146 MMHG | TEMPERATURE: 98.4 F | RESPIRATION RATE: 18 BRPM | HEART RATE: 69 BPM | BODY MASS INDEX: 31.64 KG/M2 | HEIGHT: 71 IN

## 2022-08-16 DIAGNOSIS — Z86.010 HISTORY OF ADENOMATOUS POLYP OF COLON: ICD-10-CM

## 2022-08-16 DIAGNOSIS — R19.7 DIARRHEA, UNSPECIFIED TYPE: ICD-10-CM

## 2022-08-16 PROCEDURE — 99213 OFFICE O/P EST LOW 20 MIN: CPT | Performed by: INTERNAL MEDICINE

## 2022-08-16 RX ORDER — GLUCOSAM/CHONDRO/HERB 149/HYAL 750-100 MG
1 TABLET TABLET ORAL ONCE A DAY
Status: ACTIVE | COMMUNITY

## 2022-08-16 RX ORDER — IRBESARTAN 300 MG/300MG
1 TABLET TABLET ORAL ONCE A DAY
Refills: 0 | Status: ACTIVE | COMMUNITY
Start: 2019-04-02

## 2022-08-16 RX ORDER — SODIUM PICOSULFATE, MAGNESIUM OXIDE, AND ANHYDROUS CITRIC ACID 10; 3.5; 12 MG/160ML; G/160ML; G/160ML
320 ML LIQUID ORAL ONCE
Qty: 320 ML | Refills: 0 | OUTPATIENT
Start: 2022-08-16 | End: 2022-08-17

## 2022-08-16 RX ORDER — HYDRALAZINE HYDROCHLORIDE 25 MG/1
1 TABLET TABLET ORAL TWICE DAILY
Refills: 0 | Status: ACTIVE | COMMUNITY
Start: 2014-07-18

## 2022-08-16 NOTE — HPI-OTHER HISTORIES
Colonoscopy and 2/8/2017 revealed a normal terminal ileum, moderate internal hemorrhoids, left colonic diverticulosis.  There were 2 medium-size lipomas 10-15 mm in size in the transverse colon.  There were 3 tubular adenomas in the ascending colon and transverse colon about 3-5 mm in size.

## 2022-08-16 NOTE — HPI-TODAY'S VISIT:
Mr. Walton is a 71-year-old male with a history of coronary artery disease with prior MI status post CABG and stent placement x3, sleep apnea, insulin-dependent type 2 diabetes, stage III chronic kidney disease, and adenomatous colon polyp, presenting for 3-week follow-up of diarrhea, gas and abdominal discomfort. He was seen in the office on 10/4/2020 for diarrhea predominant bowel habits ongoing for 3 months following use of clindamycin and amoxicillin following a dental procedure.  Symptoms were concerning for possible C. difficile colitis for which she was recommended stool studies to assess for infection.  Stool studies on 10/9/2028 were negative for C. difficile toxin, as well as extensive GI stool profile. He feels well currently and is off his anticoagulants.  He denies any dysphagia.  Does get some gaseousness after eating with some fullness.  He has heartburn maybe once a week.  There's been no abdominal pain, fevers, nausea or vomiting.  He moves his bowels about once or twice per day and these are soft sometimes loose there's been no blood or melena.

## 2022-09-26 ENCOUNTER — OFFICE VISIT (OUTPATIENT)
Dept: URBAN - METROPOLITAN AREA SURGERY CENTER 25 | Facility: SURGERY CENTER | Age: 72
End: 2022-09-26
Payer: MEDICARE

## 2022-09-26 ENCOUNTER — CLAIMS CREATED FROM THE CLAIM WINDOW (OUTPATIENT)
Dept: URBAN - METROPOLITAN AREA CLINIC 4 | Facility: CLINIC | Age: 72
End: 2022-09-26
Payer: MEDICARE

## 2022-09-26 DIAGNOSIS — D12.2 ADENOMA OF ASCENDING COLON: ICD-10-CM

## 2022-09-26 DIAGNOSIS — D12.2 BENIGN NEOPLASM OF ASCENDING COLON: ICD-10-CM

## 2022-09-26 DIAGNOSIS — D12.3 BENIGN NEOPLASM OF TRANSVERSE COLON: ICD-10-CM

## 2022-09-26 DIAGNOSIS — Z86.010 ADENOMAS PERSONAL HISTORY OF COLONIC POLYPS: ICD-10-CM

## 2022-09-26 DIAGNOSIS — D17.5 LIPOMA OF COLON: ICD-10-CM

## 2022-09-26 DIAGNOSIS — R19.7 DIARRHEA: ICD-10-CM

## 2022-09-26 DIAGNOSIS — K63.89 OTHER SPECIFIED DISEASES OF INTESTINE: ICD-10-CM

## 2022-09-26 DIAGNOSIS — D12.3 ADENOMA OF TRANSVERSE COLON: ICD-10-CM

## 2022-09-26 PROCEDURE — G8907 PT DOC NO EVENTS ON DISCHARG: HCPCS | Performed by: INTERNAL MEDICINE

## 2022-09-26 PROCEDURE — 45380 COLONOSCOPY AND BIOPSY: CPT | Performed by: INTERNAL MEDICINE

## 2022-09-26 PROCEDURE — 88305 TISSUE EXAM BY PATHOLOGIST: CPT | Performed by: PATHOLOGY

## 2022-09-26 PROCEDURE — 45385 COLONOSCOPY W/LESION REMOVAL: CPT | Performed by: INTERNAL MEDICINE

## 2022-09-26 RX ORDER — GLUCOSAM/CHONDRO/HERB 149/HYAL 750-100 MG
1 TABLET TABLET ORAL ONCE A DAY
Status: ACTIVE | COMMUNITY

## 2022-09-26 RX ORDER — HYDRALAZINE HYDROCHLORIDE 25 MG/1
1 TABLET TABLET ORAL TWICE DAILY
Refills: 0 | Status: ACTIVE | COMMUNITY
Start: 2014-07-18

## 2022-09-26 RX ORDER — IRBESARTAN 300 MG/300MG
1 TABLET TABLET ORAL ONCE A DAY
Refills: 0 | Status: ACTIVE | COMMUNITY
Start: 2019-04-02

## 2022-10-17 ENCOUNTER — OFFICE VISIT (OUTPATIENT)
Dept: URBAN - METROPOLITAN AREA CLINIC 107 | Facility: CLINIC | Age: 72
End: 2022-10-17

## 2022-10-17 PROBLEM — 428283002 HISTORY OF POLYP OF COLON: Status: ACTIVE | Noted: 2022-10-04

## 2022-11-18 ENCOUNTER — DASHBOARD ENCOUNTERS (OUTPATIENT)
Age: 72
End: 2022-11-18

## 2022-11-18 ENCOUNTER — OFFICE VISIT (OUTPATIENT)
Dept: URBAN - METROPOLITAN AREA CLINIC 107 | Facility: CLINIC | Age: 72
End: 2022-11-18
Payer: MEDICARE

## 2022-11-18 VITALS
HEART RATE: 74 BPM | DIASTOLIC BLOOD PRESSURE: 101 MMHG | WEIGHT: 223 LBS | RESPIRATION RATE: 18 BRPM | BODY MASS INDEX: 31.22 KG/M2 | TEMPERATURE: 98.7 F | HEIGHT: 71 IN | SYSTOLIC BLOOD PRESSURE: 153 MMHG

## 2022-11-18 DIAGNOSIS — K64.0 GRADE I INTERNAL HEMORRHOIDS: ICD-10-CM

## 2022-11-18 DIAGNOSIS — K57.90 DIVERTICULOSIS: ICD-10-CM

## 2022-11-18 DIAGNOSIS — Z86.010 HISTORY OF ADENOMATOUS POLYP OF COLON: ICD-10-CM

## 2022-11-18 DIAGNOSIS — R14.3 EXCESSIVE FLATUS: ICD-10-CM

## 2022-11-18 DIAGNOSIS — D17.5 LIPOMA OF COLON: ICD-10-CM

## 2022-11-18 DIAGNOSIS — Q43.8 REDUNDANT COLON: ICD-10-CM

## 2022-11-18 DIAGNOSIS — R19.7 DIARRHEA, UNSPECIFIED TYPE: ICD-10-CM

## 2022-11-18 PROBLEM — 253794002: Status: ACTIVE | Noted: 2022-11-18

## 2022-11-18 PROBLEM — 397881000: Status: ACTIVE | Noted: 2022-11-18

## 2022-11-18 PROBLEM — 62315008: Status: ACTIVE | Noted: 2022-08-16

## 2022-11-18 PROBLEM — 429047008: Status: ACTIVE | Noted: 2022-08-16

## 2022-11-18 PROCEDURE — 99214 OFFICE O/P EST MOD 30 MIN: CPT

## 2022-11-18 RX ORDER — HYDRALAZINE HYDROCHLORIDE 25 MG/1
1 TABLET TABLET ORAL TWICE DAILY
Refills: 0 | Status: ACTIVE | COMMUNITY
Start: 2014-07-18

## 2022-11-18 RX ORDER — ASCORBIC ACID 1000 MG
1 CAPSULE TABLET ORAL ONCE A DAY
Status: ACTIVE | COMMUNITY

## 2022-11-18 RX ORDER — IRBESARTAN 300 MG/300MG
1 TABLET TABLET ORAL ONCE A DAY
Refills: 0 | Status: ACTIVE | COMMUNITY
Start: 2019-04-02

## 2022-11-18 NOTE — HPI-OTHER HISTORIES
Stool studies on 10/9/2020 were negative for C. difficile toxin, as well as extensive GI stool profile.  Colonoscopy and 2/8/2017 revealed a normal terminal ileum, moderate internal hemorrhoids, left colonic diverticulosis.  There were 2 medium-size lipomas 10-15 mm in size in the transverse colon.  There were 3 tubular adenomas in the ascending colon and transverse colon about 3-5 mm in size.

## 2022-11-18 NOTE — HPI-TODAY'S VISIT:
Mr. Walton is a 72-year-old male with a history of coronary artery disease with prior MI status post CABG and stent placement x3, sleep apnea, insulin-dependent type 2 diabetes, stage III chronic kidney disease, and adenomatous colon polyp, presenting for follow up after colonoscopy.   He was last seen on 8/16/2022.  He does have a history of tubulovillous adenoma of the colon.  Last colonoscopy was in February 2017.  He was due for surveillance colonoscopy.  This was scheduled.  His diarrhea had also improved to 1-2 soft loose stools daily.  Colonoscopy 9/26/2022:Performed without difficulty.  BB PS score of 6 using Clenpiq.  TI was normal.  Grade 1 nonbleeding internal hemorrhoids.  Normal mucosa throughout entire colon status post random biopsies.  Pathology revealed no significant abnormality, negative for microscopic colitis.  The colon was significantly redundant.  Diverticulosis in the sigmoid and descending colon. Removal of a 4 mm tubular adenoma in the ascending colon.  Removal of a 4 mm tubular adenoma in the transverse colon.  Removal of a 15 mm tubular adenoma in the transverse colon.  Ileocecal valve was moderately lipomatous.  There was a medium size lipoma measuring 12 mm in diameter in the transverse colon.  Repeat colonoscopy in 3 years for surveillance.  Patient continues to have 1-3 loose stools per day. He often has a bowel movement after drinking his cup of coffee in the morning or his glass of tea in the afternoon. He has occasional abdominal pains which he attributes to gas. He admits to excessive flatulence. He is afraid to pass stool when passing gas. He takes Pepto Bismol when out in public out of fear of incontinence. He denies nausea, vomiting, heartburn or dysphagia. He recently tore a ligament in his left foot. He will require a boot for at least 6 months.

## 2025-07-24 ENCOUNTER — TELEPHONE ENCOUNTER (OUTPATIENT)
Dept: URBAN - METROPOLITAN AREA CLINIC 6 | Facility: CLINIC | Age: 75
End: 2025-07-24